# Patient Record
Sex: FEMALE | Race: WHITE | NOT HISPANIC OR LATINO | URBAN - METROPOLITAN AREA
[De-identification: names, ages, dates, MRNs, and addresses within clinical notes are randomized per-mention and may not be internally consistent; named-entity substitution may affect disease eponyms.]

---

## 2021-04-14 ENCOUNTER — OFFICE (OUTPATIENT)
Dept: URBAN - METROPOLITAN AREA CLINIC 11 | Facility: CLINIC | Age: 21
End: 2021-04-14

## 2021-04-14 VITALS
DIASTOLIC BLOOD PRESSURE: 74 MMHG | HEIGHT: 62 IN | HEART RATE: 68 BPM | SYSTOLIC BLOOD PRESSURE: 115 MMHG | OXYGEN SATURATION: 99 % | WEIGHT: 124 LBS

## 2021-04-14 DIAGNOSIS — K62.5 HEMORRHAGE OF ANUS AND RECTUM: ICD-10-CM

## 2021-04-14 PROCEDURE — 99203 OFFICE O/P NEW LOW 30 MIN: CPT | Performed by: INTERNAL MEDICINE

## 2023-05-19 ENCOUNTER — OFFICE (OUTPATIENT)
Dept: URBAN - METROPOLITAN AREA CLINIC 11 | Facility: CLINIC | Age: 23
End: 2023-05-19

## 2023-05-19 VITALS
SYSTOLIC BLOOD PRESSURE: 130 MMHG | HEIGHT: 62 IN | WEIGHT: 132 LBS | HEART RATE: 93 BPM | DIASTOLIC BLOOD PRESSURE: 90 MMHG | OXYGEN SATURATION: 96 %

## 2023-05-19 DIAGNOSIS — R10.11 RIGHT UPPER QUADRANT PAIN: ICD-10-CM

## 2023-05-19 DIAGNOSIS — R74.01 ELEVATION OF LEVELS OF LIVER TRANSAMINASE LEVELS: ICD-10-CM

## 2023-05-19 DIAGNOSIS — R12 HEARTBURN: ICD-10-CM

## 2023-05-19 PROCEDURE — 99214 OFFICE O/P EST MOD 30 MIN: CPT | Performed by: STUDENT IN AN ORGANIZED HEALTH CARE EDUCATION/TRAINING PROGRAM

## 2023-05-19 NOTE — SERVICEHPINOTES
Ms. Lillian Hernandez is a  22-year-old white female with past medical history of  occasional headaches, who presents to gastro 1 as a referral for right upper quadrant pain.  Patient reports that she had a recent episode of intense right upper quadrant pain when she was riding horses.  It was worse after eating a big meal, she describes it as sharp and intense, lasted about 3 hours, she took hot bath and it went away completely but then  returned and she had an episode of vomiting so she went to the emergency room.  I reviewed her urgency room records from [facility].  In emergency room she was found to have [place] [id number], hemoglobin and platelets normal.  Total bilirubin 2.3, alkaline phosphatase 131, , , lipase was normal.  Urine pregnancy test was normal.  She had an ultrasound of the abdomen which was negative for any common bile duct dilation or stones or sludge.  She had then had a CT scan of the abdomen pelvis that was normal.  She was discharged on some pain medicine and told to follow up with us.  But she has not taken any of those medications.  She does use Advil about 2 times per week for headaches and she lost about 20 lb over the last 3 years due to a change in her birth control.  She endorses no blood in her stool or black tarry stool.  She does occasionally have acid reflux very infrequently.  she is here with her mother today.  Of note her mother had her gallbladder taken out but reports that she was "misdiagnosed" with Giardia because the surgeon took her gallbladder out and it was normal appearing.

## 2023-05-19 NOTE — SERVICENOTES
the patient's clinical picture certainly seems very suspicious for biliary colic given her right upper quadrant pain, worse after eating, sharp intense attack that completely resolved after a few hours, as well as elevated white count and liver enzymes and bilirubin in the emergency room but her ultrasound was negative for gallstones or biliary dilatation and the CT was negative.  I am going to recheck her liver enzymes today to see if they are still elevated.  I am going to check HIDA scan to see if she has any element of biliary dyskinesia.  If the HIDA scan is negative then we may order an MRCP to see if there is any stones or sludge that the ultrasound did not .   the patient on avoiding NSAIDs.  Will check celiac serologies as well as viral hepatitis panel today  To rule that out as a cause of her elevated liver enzymes.  Patient's mother is very concerned about Giardia since she had her gallbladder taken out and reports that she was misdiagnosed with gallbladder issues when she actually had Giardia so I am going to check a stool PCR for any infections and H pylori stool antigen given the patient has occasional acid reflux.  Encouraged her to use over-the-counter Pepcid as needed.  Will have her return to clinic in about 3 months or sooner if needed.  if HIDA scan is abnormal then we may need to discuss referral to surgery. I personally reviewed the patient's outside medical records for her visit today.  I spent 35 minutes discussing with the patient her labs, treatment plan going forward and her assessment plan.

## 2023-05-21 LAB
ACUTE HEPATITIS: HBSAG SCREEN: NEGATIVE
ACUTE HEPATITIS: HCV AB: NON REACTIVE
ACUTE HEPATITIS: HEP A AB, IGM: NEGATIVE
ACUTE HEPATITIS: HEP B CORE AB, IGM: NEGATIVE
CBC, PLATELET, NO DIFFERENTIAL: HEMATOCRIT: 39.7 % (ref 34–46.6)
CBC, PLATELET, NO DIFFERENTIAL: HEMOGLOBIN: 13.3 G/DL (ref 11.1–15.9)
CBC, PLATELET, NO DIFFERENTIAL: MCH: 30.2 PG (ref 26.6–33)
CBC, PLATELET, NO DIFFERENTIAL: MCHC: 33.5 G/DL (ref 31.5–35.7)
CBC, PLATELET, NO DIFFERENTIAL: MCV: 90 FL (ref 79–97)
CBC, PLATELET, NO DIFFERENTIAL: PLATELETS: 393 X10E3/UL (ref 150–450)
CBC, PLATELET, NO DIFFERENTIAL: RBC: 4.4 X10E6/UL (ref 3.77–5.28)
CBC, PLATELET, NO DIFFERENTIAL: RDW: 13.1 % (ref 11.7–15.4)
CBC, PLATELET, NO DIFFERENTIAL: WBC: 5.4 X10E3/UL (ref 3.4–10.8)
CELIAC AB TTG DGP TIGA: DEAMIDATED GLIADIN ABS, IGA: 3 UNITS (ref 0–19)
CELIAC AB TTG DGP TIGA: DEAMIDATED GLIADIN ABS, IGG: 4 UNITS (ref 0–19)
CELIAC AB TTG DGP TIGA: IMMUNOGLOBULIN A, QN, SERUM: 155 MG/DL (ref 87–352)
CELIAC AB TTG DGP TIGA: T-TRANSGLUTAMINASE (TTG) IGA: <2 U/ML
CELIAC AB TTG DGP TIGA: T-TRANSGLUTAMINASE (TTG) IGG: <2 U/ML
COMP. METABOLIC PANEL (14): A/G RATIO: 2 (ref 1.2–2.2)
COMP. METABOLIC PANEL (14): ALBUMIN: 4.7 G/DL (ref 3.9–5)
COMP. METABOLIC PANEL (14): ALKALINE PHOSPHATASE: 65 IU/L (ref 44–121)
COMP. METABOLIC PANEL (14): ALT (SGPT): 10 IU/L (ref 0–32)
COMP. METABOLIC PANEL (14): AST (SGOT): 15 IU/L (ref 0–40)
COMP. METABOLIC PANEL (14): BILIRUBIN, TOTAL: 0.6 MG/DL (ref 0–1.2)
COMP. METABOLIC PANEL (14): BUN/CREATININE RATIO: 14 (ref 9–23)
COMP. METABOLIC PANEL (14): BUN: 11 MG/DL (ref 6–20)
COMP. METABOLIC PANEL (14): CALCIUM: 9.2 MG/DL (ref 8.7–10.2)
COMP. METABOLIC PANEL (14): CARBON DIOXIDE, TOTAL: 20 MMOL/L (ref 20–29)
COMP. METABOLIC PANEL (14): CHLORIDE: 101 MMOL/L (ref 96–106)
COMP. METABOLIC PANEL (14): CREATININE: 0.77 MG/DL (ref 0.57–1)
COMP. METABOLIC PANEL (14): EGFR: 110 ML/MIN/1.73 (ref 59–?)
COMP. METABOLIC PANEL (14): GLOBULIN, TOTAL: 2.4 G/DL (ref 1.5–4.5)
COMP. METABOLIC PANEL (14): GLUCOSE: 85 MG/DL (ref 70–99)
COMP. METABOLIC PANEL (14): POTASSIUM: 4.3 MMOL/L (ref 3.5–5.2)
COMP. METABOLIC PANEL (14): PROTEIN, TOTAL: 7.1 G/DL (ref 6–8.5)
COMP. METABOLIC PANEL (14): SODIUM: 139 MMOL/L (ref 134–144)
HEP B SURFACE AB: HEP B SURFACE AB, QUAL: NON REACTIVE
INTERPRETATION: (no result)

## 2023-08-31 ENCOUNTER — OFFICE (OUTPATIENT)
Dept: URBAN - METROPOLITAN AREA CLINIC 11 | Facility: CLINIC | Age: 23
End: 2023-08-31